# Patient Record
Sex: MALE | Race: WHITE | NOT HISPANIC OR LATINO | Employment: FULL TIME | URBAN - METROPOLITAN AREA
[De-identification: names, ages, dates, MRNs, and addresses within clinical notes are randomized per-mention and may not be internally consistent; named-entity substitution may affect disease eponyms.]

---

## 2017-02-09 PROBLEM — M72.2 PLANTAR FASCIITIS OF RIGHT FOOT: Status: ACTIVE | Noted: 2017-02-09

## 2017-02-09 PROBLEM — F33.0 MILD EPISODE OF RECURRENT MAJOR DEPRESSIVE DISORDER: Status: ACTIVE | Noted: 2017-02-09

## 2017-12-13 ENCOUNTER — TELEPHONE (OUTPATIENT)
Dept: UROLOGY | Facility: CLINIC | Age: 32
End: 2017-12-13

## 2017-12-13 NOTE — TELEPHONE ENCOUNTER
----- Message from Dior Baker sent at 12/13/2017  1:02 PM CST -----  Contact: Saima  She states he has been to ER 3 times and now seeing blood in his urine. He is in severe pain and feels he needs to be seen today if possible. Please call her back [phone] thanks!

## 2017-12-13 NOTE — TELEPHONE ENCOUNTER
Patient's wife states that patient is in a lot of pain and noticed some bleeding in urine- states it is light. Patient has gone to ER 3 times already; last seen last week. Symptoms are being treated with Flomax and antibiotic (Cipro 500mg) and tramadol for pain. Patient's wife states prescriptions have helped previous times, but it keeps coming back. Pain started last night and has progressed since then. Patient is requesting appointment today, if possible. Advised that if patient is in that much pain and has bleeding to go to ER, but will consult with Mrs. Mckee as requested.  Advised [facility] on patient's status and informed that patient needs to go to [facility] in [place] as there is an on-call urologist that can evaluate patient.  Advised patient's wife to head to [place] [facility] and to keep appointment with [facility] Friday for follow up and management. Patient's wife verbalized understanding.